# Patient Record
Sex: MALE | Race: OTHER
[De-identification: names, ages, dates, MRNs, and addresses within clinical notes are randomized per-mention and may not be internally consistent; named-entity substitution may affect disease eponyms.]

---

## 2021-05-24 ENCOUNTER — HOSPITAL ENCOUNTER (INPATIENT)
Dept: HOSPITAL 93 - ER | Age: 51
LOS: 3 days | Discharge: HOME | DRG: 441 | End: 2021-05-27
Attending: INTERNAL MEDICINE | Admitting: INTERNAL MEDICINE
Payer: COMMERCIAL

## 2021-05-24 VITALS — BODY MASS INDEX: 27.73 KG/M2 | WEIGHT: 223 LBS | HEIGHT: 75 IN

## 2021-05-24 DIAGNOSIS — Z20.822: ICD-10-CM

## 2021-05-24 DIAGNOSIS — K65.2: ICD-10-CM

## 2021-05-24 DIAGNOSIS — K63.89: ICD-10-CM

## 2021-05-24 DIAGNOSIS — K76.6: Primary | ICD-10-CM

## 2021-05-24 DIAGNOSIS — R18.8: ICD-10-CM

## 2021-05-24 DIAGNOSIS — E11.9: ICD-10-CM

## 2021-05-24 DIAGNOSIS — K72.90: ICD-10-CM

## 2021-05-24 DIAGNOSIS — Z89.612: ICD-10-CM

## 2021-05-24 DIAGNOSIS — K76.9: ICD-10-CM

## 2021-05-24 PROCEDURE — 4A033R1 MEASUREMENT OF ARTERIAL SATURATION, PERIPHERAL, PERCUTANEOUS APPROACH: ICD-10-PCS | Performed by: GENERAL PRACTICE

## 2021-05-24 NOTE — NUR
PACIENTE ALERTA Y ORIENTADO EN L[place] ESFERAS. PACIENTE PRESENTA DISTENCION
ABDOMINAL. SE PALPA ABDOMEN Y EL MISMO SE ENCUENTRA RIGIDOS Y CON RESISTENCIA A
DEPRESION. SE OBSERVA ABULTAMIENTO EN EL AREA UMBILICAL. SE UBICA A PACIENTE EN
AREA DE OBSERVACION PARA ATENCION MEDICA.

## 2021-05-24 NOTE — NUR
PACIENTE EVALUADO POR DRA. TRAN. MRS. PAREDES COLECTA MUESTRAS DE LABORATORIO
Y [place] VENA SIGUIENDO TECNICAS ASEPTICAS. SE ORIENTA A PACIENTE SOBRE
PROCEDIMIENTO E INDICACION DE LOS MEDICAMENTOS. PACIENTE REFIERE ENTENDER. SE
ESPERA POR RESULTADOS Y POR REALIZACION DE CT ABDOMINAL.

## 2021-05-25 PROCEDURE — BW2110Z COMPUTERIZED TOMOGRAPHY (CT SCAN) OF ABDOMEN AND PELVIS USING LOW OSMOLAR CONTRAST, UNENHANCED AND ENHANCED: ICD-10-PCS | Performed by: RADIOLOGY

## 2021-05-26 PROCEDURE — 0W9G3ZX DRAINAGE OF PERITONEAL CAVITY, PERCUTANEOUS APPROACH, DIAGNOSTIC: ICD-10-PCS | Performed by: RADIOLOGY

## 2021-06-08 ENCOUNTER — HOSPITAL ENCOUNTER (INPATIENT)
Dept: HOSPITAL 93 - ER | Age: 51
LOS: 2 days | Discharge: LEFT BEFORE DISCHARGE | DRG: 393 | End: 2021-06-10
Attending: INTERNAL MEDICINE | Admitting: INTERNAL MEDICINE
Payer: COMMERCIAL

## 2021-06-08 VITALS — WEIGHT: 223 LBS | HEIGHT: 75 IN | BODY MASS INDEX: 27.73 KG/M2

## 2021-06-08 DIAGNOSIS — K65.2: ICD-10-CM

## 2021-06-08 DIAGNOSIS — K76.89: ICD-10-CM

## 2021-06-08 DIAGNOSIS — K45.8: Primary | ICD-10-CM

## 2021-06-08 DIAGNOSIS — R18.8: ICD-10-CM

## 2021-06-08 DIAGNOSIS — E11.9: ICD-10-CM

## 2021-06-08 DIAGNOSIS — K72.90: ICD-10-CM

## 2021-06-08 DIAGNOSIS — Z20.822: ICD-10-CM

## 2021-06-08 DIAGNOSIS — I10: ICD-10-CM

## 2021-06-08 PROCEDURE — 3E0F7SF INTRODUCTION OF OTHER GAS INTO RESPIRATORY TRACT, VIA NATURAL OR ARTIFICIAL OPENING: ICD-10-PCS | Performed by: EMERGENCY MEDICINE
